# Patient Record
Sex: FEMALE
[De-identification: names, ages, dates, MRNs, and addresses within clinical notes are randomized per-mention and may not be internally consistent; named-entity substitution may affect disease eponyms.]

---

## 2017-12-05 ENCOUNTER — LAB SERVICES (OUTPATIENT)
Dept: OTHER | Age: 22
End: 2017-12-05

## 2017-12-05 ENCOUNTER — CHARTING TRANS (OUTPATIENT)
Dept: OTHER | Age: 22
End: 2017-12-05

## 2017-12-05 LAB
GLUCOSE U: NORMAL
PROTEIN: NORMAL

## 2018-02-14 ENCOUNTER — CHARTING TRANS (OUTPATIENT)
Dept: OTHER | Age: 23
End: 2018-02-14

## 2018-04-15 ENCOUNTER — HOSPITAL ENCOUNTER (OUTPATIENT)
Facility: HOSPITAL | Age: 23
Setting detail: OBSERVATION
Discharge: HOME | End: 2018-04-15
Attending: OBSTETRICS & GYNECOLOGY | Admitting: OBSTETRICS & GYNECOLOGY
Payer: MEDICAID

## 2018-04-15 VITALS
TEMPERATURE: 98 F | HEART RATE: 70 BPM | RESPIRATION RATE: 18 BRPM | DIASTOLIC BLOOD PRESSURE: 73 MMHG | SYSTOLIC BLOOD PRESSURE: 119 MMHG

## 2018-04-15 PROBLEM — O47.9 UTERINE CONTRACTIONS DURING PREGNANCY: Status: ACTIVE | Noted: 2018-04-15

## 2018-04-15 PROCEDURE — 59025 FETAL NON-STRESS TEST: CPT | Performed by: OBSTETRICS & GYNECOLOGY

## 2018-04-16 NOTE — TRIAGE
[facility]      Triage Note    Cassidy Tam Patient Status:  Observation    1995 MRN [id number]   Location [address]  Attending Barry Kim MD   Hosp Day # 0 PCP No primary care provider on file. prerna irregularly at 0900 this morning and contractions have remained 10-15min apart. Denies LOF or vaginal bleeding. States + fetal movement.      Aracelis España RN  4/15/2018 7:28 PM      ADDENDUM:  Diagnosis: Latent labor  ER patient (walk in

## 2018-11-02 VITALS — DIASTOLIC BLOOD PRESSURE: 60 MMHG | WEIGHT: 160.8 LBS | SYSTOLIC BLOOD PRESSURE: 100 MMHG

## 2023-03-06 ENCOUNTER — TELEPHONE (OUTPATIENT)
Dept: OBGYN CLINIC | Facility: CLINIC | Age: 28
End: 2023-03-06

## 2023-03-06 NOTE — TELEPHONE ENCOUNTER
Pt calling to report +HPT and LMP of 1/14. Pt reports cycles every 28 days. Pt is on pnv. Pt informed we have male and female providers and she will see all of them and on call MD will deliver. Pt informed her first appt is with the RN and pt scheduled OBN for 3/21.